# Patient Record
Sex: MALE | Race: WHITE | HISPANIC OR LATINO | Employment: PART TIME | URBAN - METROPOLITAN AREA
[De-identification: names, ages, dates, MRNs, and addresses within clinical notes are randomized per-mention and may not be internally consistent; named-entity substitution may affect disease eponyms.]

---

## 2020-09-08 ENCOUNTER — HOSPITAL ENCOUNTER (EMERGENCY)
Facility: HOSPITAL | Age: 40
Discharge: HOME | End: 2020-09-08
Attending: ORTHOPAEDIC SURGERY | Admitting: ORTHOPAEDIC SURGERY

## 2020-09-08 ENCOUNTER — HOSPITAL ENCOUNTER (EMERGENCY)
Facility: HOSPITAL | Age: 40
Discharge: ANOTHER HOSPITAL | End: 2020-09-08
Attending: EMERGENCY MEDICINE
Payer: OTHER GOVERNMENT

## 2020-09-08 VITALS
RESPIRATION RATE: 14 BRPM | TEMPERATURE: 99 F | DIASTOLIC BLOOD PRESSURE: 78 MMHG | OXYGEN SATURATION: 100 % | SYSTOLIC BLOOD PRESSURE: 135 MMHG | HEART RATE: 72 BPM

## 2020-09-08 VITALS
TEMPERATURE: 99 F | BODY MASS INDEX: 23.54 KG/M2 | WEIGHT: 150 LBS | HEIGHT: 67 IN | HEART RATE: 84 BPM | RESPIRATION RATE: 17 BRPM | DIASTOLIC BLOOD PRESSURE: 81 MMHG | SYSTOLIC BLOOD PRESSURE: 134 MMHG | OXYGEN SATURATION: 98 %

## 2020-09-08 DIAGNOSIS — S62.639B OPEN FRACTURE OF DISTAL PHALANX OF DIGIT OF LEFT HAND: Primary | ICD-10-CM

## 2020-09-08 DIAGNOSIS — S68.119A TRAUMATIC AMPUTATION OF MULTIPLE FINGERS: Primary | ICD-10-CM

## 2020-09-08 LAB
ALBUMIN SERPL BCP-MCNC: 4.4 G/DL (ref 3.5–5.2)
ALP SERPL-CCNC: 56 U/L (ref 55–135)
ALT SERPL W/O P-5'-P-CCNC: 32 U/L (ref 10–44)
ANION GAP SERPL CALC-SCNC: 13 MMOL/L (ref 8–16)
AST SERPL-CCNC: 32 U/L (ref 10–40)
BASOPHILS # BLD AUTO: 0.09 K/UL (ref 0–0.2)
BASOPHILS NFR BLD: 0.8 % (ref 0–1.9)
BILIRUB SERPL-MCNC: 0.5 MG/DL (ref 0.1–1)
BUN SERPL-MCNC: 17 MG/DL (ref 6–20)
CALCIUM SERPL-MCNC: 9.5 MG/DL (ref 8.7–10.5)
CHLORIDE SERPL-SCNC: 102 MMOL/L (ref 95–110)
CO2 SERPL-SCNC: 23 MMOL/L (ref 23–29)
CREAT SERPL-MCNC: 1.4 MG/DL (ref 0.5–1.4)
DIFFERENTIAL METHOD: ABNORMAL
EOSINOPHIL # BLD AUTO: 0.6 K/UL (ref 0–0.5)
EOSINOPHIL NFR BLD: 5.1 % (ref 0–8)
ERYTHROCYTE [DISTWIDTH] IN BLOOD BY AUTOMATED COUNT: 12.6 % (ref 11.5–14.5)
EST. GFR  (AFRICAN AMERICAN): >60 ML/MIN/1.73 M^2
EST. GFR  (NON AFRICAN AMERICAN): >60 ML/MIN/1.73 M^2
GLUCOSE SERPL-MCNC: 123 MG/DL (ref 70–110)
HCT VFR BLD AUTO: 41.3 % (ref 40–54)
HGB BLD-MCNC: 14.2 G/DL (ref 14–18)
IMM GRANULOCYTES # BLD AUTO: 0.05 K/UL (ref 0–0.04)
IMM GRANULOCYTES NFR BLD AUTO: 0.5 % (ref 0–0.5)
LYMPHOCYTES # BLD AUTO: 3.5 K/UL (ref 1–4.8)
LYMPHOCYTES NFR BLD: 31.9 % (ref 18–48)
MCH RBC QN AUTO: 30 PG (ref 27–31)
MCHC RBC AUTO-ENTMCNC: 34.4 G/DL (ref 32–36)
MCV RBC AUTO: 87 FL (ref 82–98)
MONOCYTES # BLD AUTO: 0.8 K/UL (ref 0.3–1)
MONOCYTES NFR BLD: 6.8 % (ref 4–15)
NEUTROPHILS # BLD AUTO: 6.1 K/UL (ref 1.8–7.7)
NEUTROPHILS NFR BLD: 54.9 % (ref 38–73)
NRBC BLD-RTO: 0 /100 WBC
PLATELET # BLD AUTO: 446 K/UL (ref 150–350)
PMV BLD AUTO: 9.9 FL (ref 9.2–12.9)
POTASSIUM SERPL-SCNC: 4.4 MMOL/L (ref 3.5–5.1)
PROT SERPL-MCNC: 8.2 G/DL (ref 6–8.4)
RBC # BLD AUTO: 4.74 M/UL (ref 4.6–6.2)
SARS-COV-2 RDRP RESP QL NAA+PROBE: NEGATIVE
SODIUM SERPL-SCNC: 138 MMOL/L (ref 136–145)
WBC # BLD AUTO: 11.08 K/UL (ref 3.9–12.7)

## 2020-09-08 PROCEDURE — 25000003 PHARM REV CODE 250: Performed by: EMERGENCY MEDICINE

## 2020-09-08 PROCEDURE — 99284 PR EMERGENCY DEPT VISIT,LEVEL IV: ICD-10-PCS | Mod: ,,, | Performed by: EMERGENCY MEDICINE

## 2020-09-08 PROCEDURE — 12001 RPR S/N/AX/GEN/TRNK 2.5CM/<: CPT | Mod: F2

## 2020-09-08 PROCEDURE — 96376 TX/PRO/DX INJ SAME DRUG ADON: CPT

## 2020-09-08 PROCEDURE — 63600175 PHARM REV CODE 636 W HCPCS: Performed by: EMERGENCY MEDICINE

## 2020-09-08 PROCEDURE — 85025 COMPLETE CBC W/AUTO DIFF WBC: CPT

## 2020-09-08 PROCEDURE — 99284 EMERGENCY DEPT VISIT MOD MDM: CPT | Mod: 25

## 2020-09-08 PROCEDURE — 99284 EMERGENCY DEPT VISIT MOD MDM: CPT | Mod: ,,, | Performed by: EMERGENCY MEDICINE

## 2020-09-08 PROCEDURE — 80053 COMPREHEN METABOLIC PANEL: CPT

## 2020-09-08 PROCEDURE — U0002 COVID-19 LAB TEST NON-CDC: HCPCS

## 2020-09-08 PROCEDURE — 96375 TX/PRO/DX INJ NEW DRUG ADDON: CPT

## 2020-09-08 PROCEDURE — 96365 THER/PROPH/DIAG IV INF INIT: CPT

## 2020-09-08 PROCEDURE — 90715 TDAP VACCINE 7 YRS/> IM: CPT | Performed by: EMERGENCY MEDICINE

## 2020-09-08 PROCEDURE — 96366 THER/PROPH/DIAG IV INF ADDON: CPT

## 2020-09-08 PROCEDURE — 90471 IMMUNIZATION ADMIN: CPT | Performed by: EMERGENCY MEDICINE

## 2020-09-08 PROCEDURE — 99284 EMERGENCY DEPT VISIT MOD MDM: CPT | Mod: 25,27

## 2020-09-08 RX ORDER — MORPHINE SULFATE 4 MG/ML
4 INJECTION, SOLUTION INTRAMUSCULAR; INTRAVENOUS
Status: COMPLETED | OUTPATIENT
Start: 2020-09-08 | End: 2020-09-08

## 2020-09-08 RX ORDER — CEFAZOLIN SODIUM 1 G/50ML
1 SOLUTION INTRAVENOUS
Status: COMPLETED | OUTPATIENT
Start: 2020-09-08 | End: 2020-09-08

## 2020-09-08 RX ORDER — CEFAZOLIN SODIUM 1 G/3ML
2 INJECTION, POWDER, FOR SOLUTION INTRAMUSCULAR; INTRAVENOUS ONCE
Status: COMPLETED | OUTPATIENT
Start: 2020-09-08 | End: 2020-09-08

## 2020-09-08 RX ORDER — SULFAMETHOXAZOLE AND TRIMETHOPRIM 800; 160 MG/1; MG/1
1 TABLET ORAL 2 TIMES DAILY
Qty: 14 TABLET | Refills: 0 | Status: SHIPPED | OUTPATIENT
Start: 2020-09-08 | End: 2020-09-15

## 2020-09-08 RX ORDER — IBUPROFEN 400 MG/1
400 TABLET ORAL EVERY 6 HOURS PRN
Qty: 20 TABLET | Refills: 0 | Status: SHIPPED | OUTPATIENT
Start: 2020-09-08

## 2020-09-08 RX ORDER — LIDOCAINE HYDROCHLORIDE 10 MG/ML
20 INJECTION, SOLUTION EPIDURAL; INFILTRATION; INTRACAUDAL; PERINEURAL
Status: COMPLETED | OUTPATIENT
Start: 2020-09-08 | End: 2020-09-08

## 2020-09-08 RX ADMIN — LIDOCAINE HYDROCHLORIDE 200 MG: 10 INJECTION, SOLUTION EPIDURAL; INFILTRATION; INTRACAUDAL; PERINEURAL at 08:09

## 2020-09-08 RX ADMIN — CLOSTRIDIUM TETANI TOXOID ANTIGEN (FORMALDEHYDE INACTIVATED), CORYNEBACTERIUM DIPHTHERIAE TOXOID ANTIGEN (FORMALDEHYDE INACTIVATED), BORDETELLA PERTUSSIS TOXOID ANTIGEN (GLUTARALDEHYDE INACTIVATED), BORDETELLA PERTUSSIS FILAMENTOUS HEMAGGLUTININ ANTIGEN (FORMALDEHYDE INACTIVATED), BORDETELLA PERTUSSIS PERTACTIN ANTIGEN, AND BORDETELLA PERTUSSIS FIMBRIAE 2/3 ANTIGEN 0.5 ML: 5; 2; 2.5; 5; 3; 5 INJECTION, SUSPENSION INTRAMUSCULAR at 08:09

## 2020-09-08 RX ADMIN — CEFAZOLIN SODIUM 1 G: 1 SOLUTION INTRAVENOUS at 12:09

## 2020-09-08 RX ADMIN — MORPHINE SULFATE 4 MG: 4 INJECTION INTRAVENOUS at 09:09

## 2020-09-08 RX ADMIN — GENTAMICIN SULFATE 200 MG: 40 INJECTION, SOLUTION INTRAMUSCULAR; INTRAVENOUS at 09:09

## 2020-09-08 RX ADMIN — CEFAZOLIN 2 G: 1 INJECTION, POWDER, FOR SOLUTION INTRAMUSCULAR; INTRAVENOUS at 08:09

## 2020-09-08 RX ADMIN — MORPHINE SULFATE 4 MG: 4 INJECTION INTRAVENOUS at 12:09

## 2020-09-08 RX ADMIN — MORPHINE SULFATE 4 MG: 4 INJECTION INTRAVENOUS at 04:09

## 2020-09-08 NOTE — ED NOTES
Dr. Bernard at bedside explaining plan of care and transfer to [facility]. Pt verbally agrees to transfer to [facility] via hospital provided transportation.    Stewt : Reese #[id number]

## 2020-09-08 NOTE — ED TRIAGE NOTES
Pt presents to ED with lacerations to pts left 3rd, 4th, and 5th digits. Pt states that he cut his hand repairing a lawnmower blade about 30 minutes PTA. Pt denies other medical complaints at this time.     Pt wounds cleaned with sterile water and dressing applied.     APPEARANCE: Alert, oriented and in no acute distress.  CARDIAC: Normal rate and rhythm.  PERIPHERAL VASCULAR: peripheral pulses present. Normal cap refill. No edema. Warm to touch.    RESPIRATORY:Normal rate and effort, breath sounds clear bilaterally throughout chest. Respirations are equal and unlabored no obvious signs of distress.  GASTRO: soft, bowel sounds normal, no tenderness, no abdominal distention.  MUSC: Full ROM. No bony tenderness or soft tissue tenderness. No obvious deformity.  SKIN: Skin is warm and dry, normal skin turgor, mucous membranes moist. Lacerations noted to 2nd, 3rd and 4th left digits.   NEURO: 5/5 strength major flexors/extensors bilaterally. Sensory intact to light touch bilaterally. Dwight coma scale: eyes open spontaneously-4, oriented & converses-5, obeys commands-6. No neurological abnormalities.   MENTAL STATUS: awake, alert and aware of environment.
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
## 2020-09-08 NOTE — ED NOTES
Pts wife at bedside- pt states wife to sign transfer form. Stratus  used to verify consent to be transferred.    Stratus : Abram #[id number]

## 2020-09-09 ENCOUNTER — OCCUPATIONAL HEALTH (OUTPATIENT)
Dept: URGENT CARE | Facility: CLINIC | Age: 40
End: 2020-09-09
Payer: OTHER GOVERNMENT

## 2020-09-09 DIAGNOSIS — Z02.83 ENCOUNTER FOR DRUG SCREENING: Primary | ICD-10-CM

## 2020-09-09 PROCEDURE — 80305 DRUG TEST PRSMV DIR OPT OBS: CPT | Mod: S$GLB,,, | Performed by: PREVENTIVE MEDICINE

## 2020-09-09 PROCEDURE — 80305 OOH NON-DOT DRUG SCREEN: ICD-10-PCS | Mod: S$GLB,,, | Performed by: PREVENTIVE MEDICINE

## 2020-09-09 NOTE — ED NOTES
Patient on stretcher, Bed placed in low/locked position, side rails up x 2, call light is within reach of patient or family, Patient placed into position of comfort. Patient in NAD and offers no complaints at this time. Pain goals addressed at this time. Will continue to monitor.

## 2020-09-09 NOTE — PROGRESS NOTES
ED Course: I, Alvina Hawk MD have assumed care of this patient from Dr. Marquis . Patient is a   40-year-old was transferred from [facility] following digital amputation of 3rd, 4th and 5th distal portions of digits.    At the time of signout plan was pending   Orthopedic consult..    Medications given in the ED:    Medications   Tdap vaccine injection 0.5 mL (0.5 mLs Intramuscular Given 9/8/20 2017)   lidocaine (PF) 10 mg/ml (1%) injection 200 mg (200 mg Intradermal Given by Other 9/8/20 2036)   ceFAZolin injection 2 g (2 g Intravenous Given 9/8/20 2033)   gentamicin (GARAMYCIN) 200 mg in sodium chloride 0.9% 100 mL IVPB (200 mg Intravenous New Bag 9/8/20 2115)   morphine injection 4 mg (4 mg Intravenous Given 9/8/20 2115)       Disposition: d/c home     Impression:  Digit amputations

## 2020-09-09 NOTE — DISCHARGE INSTRUCTIONS
Consulte a un cirujano ortopédico dentro de las próximas 1-2 semanas. Llame al [phone] para concertar bijan shayy. Regrese a la dallas de emergencias si el dolor empeora, la fiebre, el sangrado o en general se siente peor.

## 2020-09-09 NOTE — ED NOTES
Lance Sevilla, a 40 y.o. male presents to the ED w/ complaint of lacerated 2, 3, 4 digits to left hand. Transferred from [place] for ortho surgery. Pt was cleaning lawnmower at time of injury. Bleeding controlled.     Triage note:  Chief Complaint   Patient presents with    [place] transfer     lacerated left 2nd, 3rd, and 4th digits cleaning a lawnmower blade and is here for hand surgery.       Review of patient's allergies indicates:  No Known Allergies  No past medical history on file.  Adult Physical Assessment  LOC: Lance Sevilla, 40 y.o. male verified via two identifiers.  The patient is awake, alert, oriented and speaking appropriately at this time.  APPEARANCE: Patient resting comfortably and appears to be in no acute distress at this time. Patient is clean and well groomed, patient's clothing is properly fastened.  SKIN:The skin is warm and dry, color consistent with ethnicity, patient has normal skin turgor and moist mucus membranes, skin intact, no breakdown or brusing noted.  MUSCULOSKELETAL: Patient moving all extremities well, no obvious swelling or deformities noted. Lacerated 2, 3, 4 digits to left hand, bleeding controlled.  RESPIRATORY: Airway is open and patent, respirations are spontaneous, patient has a normal effort and rate, no accessory muscle use noted.  CARDIAC: Patient has a normal rate and rhythm, no periphreal edema noted in any extremity, capillary refill < 3 seconds in all extremities  ABDOMEN: Soft and non tender to palpation, no abdominal distention noted. Bowel sounds present in all four quadrants.  NEUROLOGIC: Eyes open spontaneously, behavior appropriate to situation, follows commands, facial expression symmetrical, bilateral hand grasp equal and even, purposeful motor response noted, normal sensation in all extremities when touched with a finger.

## 2020-09-09 NOTE — ED PROVIDER NOTES
CC: [place] transfer (lacerated left 2nd, 3rd, and 4th digits cleaning a lawnmower blade and is here for hand surgery.  )      History provided by:   Patient       HPI: Lance Sevilla is a 40 y.o. year old male who presents to the ED complaining of left 3rd 4th and 5th distal digit amputation after his cut his hand on the lawnmower blade around noon.  Patient is transfer from  [facility] for orthopedic surgery consult            No past medical history on file.  No past surgical history on file.  No family history on file.  Current Facility-Administered Medications on File Prior to Encounter   Medication Dose Route Frequency Provider Last Rate Last Dose    [COMPLETED] ceFAZolin (ANCEF) 1 gram in dextrose 5 % 50 mL IVPB (premix)  1 g Intravenous ED 1 Time Payal Bernard MD   Stopped at 09/08/20 1324    [COMPLETED] morphine injection 4 mg  4 mg Intravenous ED 1 Time Payal Bernard MD   4 mg at 09/08/20 1225    [COMPLETED] morphine injection 4 mg  4 mg Intravenous ED 1 Time Payal Bernard MD   4 mg at 09/08/20 1613     No current outpatient medications on file prior to encounter.     Patient has no known allergies.  Social History     Socioeconomic History    Marital status:      Spouse name: Not on file    Number of children: Not on file    Years of education: Not on file    Highest education level: Not on file   Occupational History    Not on file   Social Needs    Financial resource strain: Not on file    Food insecurity     Worry: Not on file     Inability: Not on file    Transportation needs     Medical: Not on file     Non-medical: Not on file   Tobacco Use    Smoking status: Not on file   Substance and Sexual Activity    Alcohol use: Not on file    Drug use: Not on file    Sexual activity: Not on file   Lifestyle    Physical activity     Days per week: Not on file     Minutes per session: Not on file    Stress: Not on file   Relationships    Social connections     Talks  on phone: Not on file     Gets together: Not on file     Attends Restorationist service: Not on file     Active member of club or organization: Not on file     Attends meetings of clubs or organizations: Not on file     Relationship status: Not on file   Other Topics Concern    Not on file   Social History Narrative    Not on file       ROS:     Constitutional : neg for fever, neg for weakness  HENT neg for head injury, neg for sore throat  Eyes: neg for visual changes, neg for eye pain  Resp neg for SOB, neg for cough  Cardiac  neg for chest pain, neg for palpitations  GI neg for abd pain, neg for nausea, neg for vomiting   neg for urinary changes  Neuro neg for focal weakness or numbness  Skin neg for skin rash  MSK: left hand injury  ALL: Patient has no known allergies.    PHYSICAL EXAM:  Vitals:    09/08/20 1819   BP: 130/86   Pulse: 65   Resp: 16   Temp: 98 °F (36.7 °C)         PHYSICAL EXAM:     general: comfortable, in no acute distress, pleasant, well nourished  VS: triage VS reviewed  HENT: NC/AT  Eyes: PERRL, EOMI  Resp: comfortable breathing, speaks in full sentences, Neuro: AAO x 3, face symmetric, speech normal  Left hand +2 rad pulse, full rom 1st and 2nd digit  3, 4, 5th digit kerlex dressing placed by orthopedic surgery after they evaluated the patient on arrival to the ED            DATA & INTERVENTIONS:    LABS reviewed:  Labs Reviewed - No data to display    RADIOLOGY reviewed:  Imaging Results    None         MEDICATIONS/FLUIDS:  Medications - No data to display      MDM:  Lance Sevilla is a 40 y.o. year old male who presents to the ED complaining of left 3, 4, 5th digit distal amputation. Tetanus     Orthopedic surgery consulted, recs for cefazolin, gentamycin  Old records obtained and reviewed:   Patient received cefazolin prior to arrival  Left hand x-ray reviewed    Case discussed with the consultant: ortho    Patient was signed-out to Dr. Hawk    at the change of shift with plan  for: orthopedic surgery consulted for left hand injury repair. dispo and recs per ortho        IMPRESSION:  1.) left 3, 4, 5th digit distal amputation      Dispo: pending    Critical Care Time: N/A         Mariah Marquis MD  09/08/20 3366

## 2020-09-09 NOTE — CONSULTS
Orthopedic Surgery  Consult Note    Lance Sevilla  09/09/2020    CC: Left hand laceration    HPI: Lance Sevilla is a RHD 40 y.o. malewho presents to ED as transfer from [place] after cutting his hand on lawnmower blade around noon. Patients states he was attempting to repair  blade when his hand was cut. Tetanus updated and IV ancef given at outside facility prior to transfer. The patient works in [employer]. The patient is Chinese speaking and hx taken with assistance of .       No past medical history on file.  No past surgical history on file.  No family history on file.  Social History     Socioeconomic History    Marital status: Single     Spouse name: Not on file    Number of children: Not on file    Years of education: Not on file    Highest education level: Not on file   Occupational History    Not on file   Social Needs    Financial resource strain: Not on file    Food insecurity     Worry: Not on file     Inability: Not on file    Transportation needs     Medical: Not on file     Non-medical: Not on file   Tobacco Use    Smoking status: Not on file   Substance and Sexual Activity    Alcohol use: Not on file    Drug use: Not on file    Sexual activity: Not on file   Lifestyle    Physical activity     Days per week: Not on file     Minutes per session: Not on file    Stress: Not on file   Relationships    Social connections     Talks on phone: Not on file     Gets together: Not on file     Attends Buddhism service: Not on file     Active member of club or organization: Not on file     Attends meetings of clubs or organizations: Not on file     Relationship status: Not on file   Other Topics Concern    Not on file   Social History Narrative    Not on file     Current Facility-Administered Medications on File Prior to Encounter   Medication    [COMPLETED] ceFAZolin (ANCEF) 1 gram in dextrose 5 % 50 mL IVPB (premix)    [COMPLETED] morphine injection 4 mg     [COMPLETED] morphine injection 4 mg     No current outpatient medications on file prior to encounter.         Review of Systems:  Constitutional: negative for fevers  Eyes: negative visual changes  ENT: negative for hearing loss  Respiratory: negative for dyspnea  Cardiovascular: negative for chest pain  Gastrointestinal: negative for abdominal pain  Genitourinary: negative for dysuria  Neurological: negative for headaches  Behavioral/Psych: negative for hallucinations  Endocrine: negative for temperature intolerance      Physical Exam:    Temp:  [97.8 °F (36.6 °C)-98.9 °F (37.2 °C)] 98.7 °F (37.1 °C)  Pulse:  [59-84] 72  Resp:  [14-20] 14  SpO2:  [97 %-100 %] 100 %  BP: (130-161)/() 135/78    Vitals: Afebrile.  Vital signs stable.  General: No acute distress.  HEENT: Normocephalic. Atraumatic. Sclera anicteric. No tracheal deviation.  Cardio: Regular rate.  Chest: No increased work of breathing.  Abdominal: Nondistended.  Extremities: No cyanosis.  No clubbing.    Skin: No generalized rash.  Neuro: Awake. Alert. Oriented to person, place, time, and situation.  Psych: Normal appearance. Cooperative.  Appropriate mood.  Appropriate affect.      MSK:  PE:  Gen:  No acute distress  CV:  Peripherally well-perfused.    Lungs:  Normal respiratory effort.  Head/Neck:  Normocephalic.  Atraumatic.     LUE:  2 cm laceration to volar tip of long finger   3cm diameter wound on volar aspect of tip of ring finger with exposed bone  3 cm diameter wound on volar aspect of small finger  Bony ttp about ring finger distal phalanx  FROM of all digits, FDS and FDP tested in isolation  Normal finger cascade  FROM shoulder, elbow and wrist  SILT M/R  Diminished sensation to tip of small finger  Motor intact AIN/PIN/M/U/R   Cap refill < 2s  2+ RP  FROM shoulder, elbow and wrist  SILT M/U/R  Motor intact AIN/PIN/M/U/R   Cap refill < 2s  2+ RP            Diagnostic Results: XR of L hand showing comminuted ring finger distal phalanx  fx    Assessment/Plan:  Lance Sevilla is a 40 y.o. male with left open ring finger distal phalanx fracture, and associated lacerations to left long finger and left small finger as described and shown above.     - Lacerations washed out in ED. Long finger laceration closed primarily with 4-0 nylon. Ring finger and small finger lacerations allowed to heal by secondary intention.  - NWB to BETO  -Patient notified that he may require revision amputation to his left ring finger. Given his nail plate was completely intact, in addition to the loss of volar fat pad, we did not attempt this procedure at the bedside.   -Patient would like to follow-up at [facility] for further management of this injury. We notified the patient to call the [facility] number provided first thing tomorrow in order to schedule close follow-up for possible operative management. The patient expressed his understanding and agreed with plan.  -Dc with 14 days of bactrim and pain control per ED staff    Procedure Note: Left hand laceration Repair  After time out was performed and patient ID, side, and site were verified, the left Middle finger, Ring finger, and Little fingers were sterilly prepped in the standard fashion. 5 mL's of 1% lidocaine were injected in digital blocks to each finger with a 25-gauge needle. After adequate analgesia was obtained, the wounds were examined. Small fragments of of loose bone was removed from ring finger The lacerations were then thoroughly irrigated with 2L of normal saline and betadine each. At this point, the long finger wound was primarily closed using 4 - 0 Ethilon. The patient tolerated the procedure well with no complications.  Blood loss was minimal.      William Glasgow MD  Orthopedic Surgery Resident  09/09/2020